# Patient Record
Sex: FEMALE | URBAN - METROPOLITAN AREA
[De-identification: names, ages, dates, MRNs, and addresses within clinical notes are randomized per-mention and may not be internally consistent; named-entity substitution may affect disease eponyms.]

---

## 2020-12-09 ENCOUNTER — OFFICE VISIT (OUTPATIENT)
Dept: URBAN - METROPOLITAN AREA CLINIC 27 | Facility: CLINIC | Age: 80
End: 2020-12-09
Payer: MEDICARE

## 2020-12-09 DIAGNOSIS — H43.812 VITREOUS DEGENERATION, LEFT EYE: ICD-10-CM

## 2020-12-09 DIAGNOSIS — H04.123 DRY EYE SYNDROME OF BILATERAL LACRIMAL GLANDS: ICD-10-CM

## 2020-12-09 DIAGNOSIS — H59.021 CATARACT (LENS) FRAGMENTS IN EYE FOLLOWING CATARACT SURGERY, RIGHT EYE: ICD-10-CM

## 2020-12-09 PROCEDURE — 92235 FLUORESCEIN ANGRPH MLTIFRAME: CPT | Performed by: OPHTHALMOLOGY

## 2020-12-09 PROCEDURE — 99214 OFFICE O/P EST MOD 30 MIN: CPT | Performed by: OPHTHALMOLOGY

## 2020-12-09 PROCEDURE — 92134 CPTRZ OPH DX IMG PST SGM RTA: CPT | Performed by: OPHTHALMOLOGY

## 2020-12-09 ASSESSMENT — INTRAOCULAR PRESSURE
OD: 17
OS: 20

## 2020-12-09 NOTE — IMPRESSION/PLAN
Impression: h/o RLF, OD. Status: Symptomatic.
s/p PPV/Rem RLF/MP/PRP/ACIOL/IVK 01/11/12 ( [address] [place] [address]) Plan: As above.

## 2020-12-09 NOTE — IMPRESSION/PLAN
Impression: Mild CME, OD. Status: Symptomatic. JXT, OD
S/P PPV/MP ILM/PRP/IVK x 04/17/09 
s/p IVK last 11/20/09
s/p PPV/MP ILM/ICG/PRP/IVK  09/27/10
s/p Focal laser 03/11/11
s/p Avastin x 3 last 05/20/11 Plan: Exam and OCT reveal mild CME OD (332 microns). An IVFA from 12/9/2020 demonstrated mild staining and telangiectasia OD. Fundus Photos from 12/9/2020 demonstrated no IRH. Observe. Thanks, [employer] Return in 6 months for Follow-Up Visit, OCT OU, IVFA OD 1st

## 2021-01-01 ENCOUNTER — OFFICE VISIT (OUTPATIENT)
Dept: URBAN - METROPOLITAN AREA CLINIC 27 | Facility: CLINIC | Age: 81
End: 2021-01-01
Payer: MEDICARE

## 2021-01-01 DIAGNOSIS — H02.401 UNSPECIFIED PTOSIS OF RIGHT EYELID: ICD-10-CM

## 2021-01-01 DIAGNOSIS — H35.81 RETINAL EDEMA: Primary | ICD-10-CM

## 2021-01-01 DIAGNOSIS — H25.12 AGE-RELATED NUCLEAR CATARACT, LEFT EYE: ICD-10-CM

## 2021-01-01 DIAGNOSIS — Z96.1 PRESENCE OF INTRAOCULAR LENS: ICD-10-CM

## 2021-01-01 PROCEDURE — 99214 OFFICE O/P EST MOD 30 MIN: CPT | Performed by: OPHTHALMOLOGY

## 2021-01-01 PROCEDURE — 92235 FLUORESCEIN ANGRPH MLTIFRAME: CPT | Performed by: OPHTHALMOLOGY

## 2021-01-01 PROCEDURE — 92134 CPTRZ OPH DX IMG PST SGM RTA: CPT | Performed by: OPHTHALMOLOGY

## 2021-01-01 ASSESSMENT — INTRAOCULAR PRESSURE
OS: 10
OD: 19

## 2021-09-08 NOTE — IMPRESSION/PLAN
Impression: h/o RLF, OD. Status: Symptomatic.
s/p PPV/Rem RLF/MP/PRP/ACIOL/IVK 01/11/12 ( [address] [place] [address]) Plan: As above.